# Patient Record
Sex: FEMALE | Race: WHITE | URBAN - METROPOLITAN AREA
[De-identification: names, ages, dates, MRNs, and addresses within clinical notes are randomized per-mention and may not be internally consistent; named-entity substitution may affect disease eponyms.]

---
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
## 2025-03-27 ENCOUNTER — OFFICE VISIT (OUTPATIENT)
Age: 20
End: 2025-03-27

## 2025-03-27 VITALS
HEART RATE: 81 BPM | OXYGEN SATURATION: 97 % | DIASTOLIC BLOOD PRESSURE: 78 MMHG | WEIGHT: 135 LBS | SYSTOLIC BLOOD PRESSURE: 120 MMHG | TEMPERATURE: 97.7 F

## 2025-03-27 DIAGNOSIS — K52.9 GASTROENTERITIS: ICD-10-CM

## 2025-03-27 DIAGNOSIS — R11.10 ABDOMINAL PAIN, VOMITING, AND DIARRHEA: Primary | ICD-10-CM

## 2025-03-27 DIAGNOSIS — R19.7 ABDOMINAL PAIN, VOMITING, AND DIARRHEA: Primary | ICD-10-CM

## 2025-03-27 DIAGNOSIS — R10.9 ABDOMINAL PAIN, VOMITING, AND DIARRHEA: Primary | ICD-10-CM

## 2025-03-27 LAB
Lab: NORMAL
PERFORMING INSTRUMENT: NORMAL
QC PASS/FAIL: NORMAL
SARS-COV-2, POC: NORMAL

## 2025-03-27 RX ORDER — LOPERAMIDE HYDROCHLORIDE 2 MG/1
2 CAPSULE ORAL 4 TIMES DAILY PRN
Qty: 28 CAPSULE | Refills: 0 | Status: SHIPPED | OUTPATIENT
Start: 2025-03-27 | End: 2025-04-03

## 2025-03-27 RX ORDER — ONDANSETRON 4 MG/1
4 TABLET, ORALLY DISINTEGRATING ORAL 3 TIMES DAILY PRN
Qty: 21 TABLET | Refills: 0 | Status: SHIPPED | OUTPATIENT
Start: 2025-03-27

## 2025-03-27 ASSESSMENT — ENCOUNTER SYMPTOMS: DIARRHEA: 1

## 2025-03-27 NOTE — PROGRESS NOTES
Maricarmen Lopez (: 2005) is a 19 y.o. female, Established patient, here for evaluation of the following chief complaint(s):  Diarrhea (Pt reports past two days started with diarrhea, throwing up, hot cold  and congestion. )      ASSESSMENT/PLAN:    ICD-10-CM    1. Abdominal pain, vomiting, and diarrhea  R10.9 POCT COVID-19, Antigen    R11.10 ondansetron (ZOFRAN-ODT) 4 MG disintegrating tablet    R19.7 loperamide (IMODIUM) 2 MG capsule      2. Gastroenteritis  K52.9 ondansetron (ZOFRAN-ODT) 4 MG disintegrating tablet     loperamide (IMODIUM) 2 MG capsule            Discussed PCP follow up for persisting or worsening symptoms, or to return to the clinic if unable to obtain PCP follow up for worsening symptoms.    The patient tolerated their visit well. The patient and/or the family were informed of the results of any tests, a time was given to answer questions, a plan was proposed and they agreed with plan. Reviewed AVS with treatment instructions and answered questions - pt/family expresses understanding and agreement with the discussed treatment plan and AVS instructions.      SUBJECTIVE/OBJECTIVE:  HPI     Diarrhea     Additional comments: Pt reports past two days started with diarrhea,   throwing up, hot cold  and congestion.           Last edited by Rani Alas MA on 3/27/2025  9:50 AM.            Diarrhea         VITAL SIGNS  Vitals:    25 0951   BP: 120/78   BP Site: Left Upper Arm   Patient Position: Sitting   BP Cuff Size: Medium Adult   Pulse: 81   Temp: 97.7 °F (36.5 °C)   TempSrc: Oral   SpO2: 97%   Weight: 61.2 kg (135 lb)       Review of Systems   Gastrointestinal:  Positive for diarrhea.     See HPI for pertinent positives and negatives.    Physical Exam  Constitutional:       General: She is not in acute distress.     Appearance: Normal appearance.   HENT:      Head: Normocephalic and atraumatic.      Right Ear: Tympanic membrane and ear canal normal.      Left Ear: Tympanic